# Patient Record
Sex: MALE | Race: WHITE | NOT HISPANIC OR LATINO | URBAN - METROPOLITAN AREA
[De-identification: names, ages, dates, MRNs, and addresses within clinical notes are randomized per-mention and may not be internally consistent; named-entity substitution may affect disease eponyms.]

---

## 2017-07-28 ENCOUNTER — EMERGENCY (EMERGENCY)
Facility: HOSPITAL | Age: 27
LOS: 1 days | Discharge: HOME | End: 2017-07-28
Attending: EMERGENCY MEDICINE | Admitting: EMERGENCY MEDICINE
Payer: SELF-PAY

## 2017-07-28 VITALS
WEIGHT: 134.48 LBS | HEART RATE: 104 BPM | HEIGHT: 68 IN | OXYGEN SATURATION: 97 % | RESPIRATION RATE: 18 BRPM | TEMPERATURE: 99 F | DIASTOLIC BLOOD PRESSURE: 65 MMHG | SYSTOLIC BLOOD PRESSURE: 107 MMHG

## 2017-07-28 DIAGNOSIS — J02.9 ACUTE PHARYNGITIS, UNSPECIFIED: ICD-10-CM

## 2017-07-28 DIAGNOSIS — R05 COUGH: ICD-10-CM

## 2017-07-28 PROCEDURE — 99053 MED SERV 10PM-8AM 24 HR FAC: CPT

## 2017-07-28 PROCEDURE — 99284 EMERGENCY DEPT VISIT MOD MDM: CPT | Mod: 25

## 2017-07-28 NOTE — ED ADULT TRIAGE NOTE - ARRIVAL INFO ADDITIONAL COMMENTS
Pt to the ER c/o sore throat x 1 week not relieved with not relieved with Tylenol or Nyquil at home.  Pt denies CP, SOB, N/V/D, fevers, sick contacts, or travel out of the country in the last 3 weeks.

## 2017-07-29 PROCEDURE — 96372 THER/PROPH/DIAG INJ SC/IM: CPT

## 2017-07-29 PROCEDURE — 99283 EMERGENCY DEPT VISIT LOW MDM: CPT | Mod: 25

## 2017-07-29 RX ORDER — ONDANSETRON 8 MG/1
4 TABLET, FILM COATED ORAL ONCE
Qty: 0 | Refills: 0 | Status: DISCONTINUED | OUTPATIENT
Start: 2017-07-29 | End: 2017-07-29

## 2017-07-29 RX ORDER — DEXAMETHASONE 0.5 MG/5ML
10 ELIXIR ORAL ONCE
Qty: 0 | Refills: 0 | Status: COMPLETED | OUTPATIENT
Start: 2017-07-29 | End: 2017-07-29

## 2017-07-29 RX ORDER — AMOXICILLIN 250 MG/5ML
1 SUSPENSION, RECONSTITUTED, ORAL (ML) ORAL
Qty: 20 | Refills: 0 | OUTPATIENT
Start: 2017-07-29 | End: 2017-08-08

## 2017-07-29 RX ORDER — AMOXICILLIN 250 MG/5ML
500 SUSPENSION, RECONSTITUTED, ORAL (ML) ORAL ONCE
Qty: 0 | Refills: 0 | Status: COMPLETED | OUTPATIENT
Start: 2017-07-29 | End: 2017-07-29

## 2017-07-29 RX ORDER — FAMOTIDINE 10 MG/ML
20 INJECTION INTRAVENOUS ONCE
Qty: 0 | Refills: 0 | Status: DISCONTINUED | OUTPATIENT
Start: 2017-07-29 | End: 2017-07-29

## 2017-07-29 RX ADMIN — Medication 500 MILLIGRAM(S): at 01:49

## 2017-07-29 RX ADMIN — Medication 500 MILLIGRAM(S): at 01:30

## 2017-07-29 RX ADMIN — Medication 10 MILLIGRAM(S): at 01:30

## 2017-07-29 NOTE — ED PROVIDER NOTE - OBJECTIVE STATEMENT
26M no PMH c/o sore throat. pt states sore throat worsening over past week. occasional cough. no fevers. states significant other was sick with cold recently.  no recent travel. no n/v/d. no drooling. no voice changes.

## 2017-07-29 NOTE — ED PROVIDER NOTE - THROAT FINDINGS
NO STRIDOR/uvula midline/tonsillar exudate, no trismus/TONSILLAR SWELLING/THROAT RED/NO DROOLING/NO TONGUE ELEVATION

## 2017-07-29 NOTE — ED ADULT NURSE NOTE - CHPI ED SYMPTOMS NEG
no loss of consciousness/no chills/no change in level of consciousness/no numbness/no nausea/no fever/no syncope/no blurred vision/no weakness/no vomiting

## 2017-07-29 NOTE — ED PROVIDER NOTE - MEDICAL DECISION MAKING DETAILS
sore throat, exudates, no evidence of PTA, no drooling, no resp distress, no airway compromise  -decadron, naproxen, amoxicillin

## 2018-05-24 ENCOUNTER — EMERGENCY (EMERGENCY)
Facility: HOSPITAL | Age: 28
LOS: 1 days | Discharge: HOME | End: 2018-05-24
Admitting: EMERGENCY MEDICINE
Payer: SELF-PAY

## 2018-05-24 VITALS
DIASTOLIC BLOOD PRESSURE: 79 MMHG | TEMPERATURE: 98 F | RESPIRATION RATE: 18 BRPM | WEIGHT: 140.21 LBS | HEART RATE: 77 BPM | SYSTOLIC BLOOD PRESSURE: 110 MMHG | OXYGEN SATURATION: 97 %

## 2018-05-24 DIAGNOSIS — Z88.2 ALLERGY STATUS TO SULFONAMIDES: ICD-10-CM
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
## 2018-05-24 DIAGNOSIS — M25.521 PAIN IN RIGHT ELBOW: ICD-10-CM

## 2018-05-24 PROCEDURE — 99283 EMERGENCY DEPT VISIT LOW MDM: CPT

## 2018-05-24 PROCEDURE — 73080 X-RAY EXAM OF ELBOW: CPT

## 2018-05-24 PROCEDURE — 73080 X-RAY EXAM OF ELBOW: CPT | Mod: 26,RT

## 2018-05-24 PROCEDURE — 99283 EMERGENCY DEPT VISIT LOW MDM: CPT | Mod: 25
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
## 2018-05-24 NOTE — ED PROVIDER NOTE - MEDICAL DECISION MAKING DETAILS
ED course unremarkable - afebrile and hemodynamically stable. ED course unremarkable - afebrile and hemodynamically stable. Tdap up to date. Declined pain medication. RUE neurovascularly intact. R elbow x-ray with posterior fat pad present, concerning for occult radial head fracture. No dislocation. Discussed results with patient. Placed in sling, recommend tylenol for pain control. Given referral to Orthopedics; however, pt states he will follow up with personal orthopedist. Return precautions given.
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
## 2018-05-24 NOTE — ED PROVIDER NOTE - NS ED ROS FT
Constitutional: No fever. No chills.  Eyes: No redness. No discharge. No vision change.   ENT: No sore throat. No ear pain.  Cardiovascular: No chest pain. No leg swelling.  Respiratory: No cough. No shortness of breath.  GI: No abdominal pain. No vomiting. No diarrhea.   MSK: +joint pain. No back pain.   Skin: No rash. +abrasion.  Neuro: No numbness. No weakness.   Psych: No known mental health issues.

## 2018-05-24 NOTE — ED PROVIDER NOTE - PHYSICAL EXAMINATION
VITAL SIGNS: I have reviewed nursing notes and confirm.  CONSTITUTIONAL: Well-developed; well-nourished; in no acute distress.   SKIN:  warm and dry, no acute rash.   HEAD:  normocephalic, atraumatic.  EYES: PERRL, EOM intact; conjunctiva and sclera clear.  ENT: No nasal discharge; airway clear.   NECK: Supple; non tender.  BACK: no midline vertebral ttp  CARD: S1, S2 normal; no murmurs, gallops, or rubs. Regular rate and rhythm.   RESP:  Clear to auscultation b/l, no wheezes, rales or rhonchi.  ABD: Normal bowel sounds; soft; non-distended; non-tender; no guarding/ rebound.  EXT: Mild swelling of R elbow. Unable to fully flex or extend 2/2 pain. Abrasion over R olecranon process. FROM of R shoulder and wrist w/o pain. No clubbing, cyanosis. 2+ pulses to b/l ue/le.  NEURO: Alert, oriented, grossly unremarkable. 5/5 strength in upper extremities. Sensation equal and intact bilaterally.  PSYCH: Cooperative, mood and affect appropriate.
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
## 2021-05-26 ENCOUNTER — EMERGENCY (EMERGENCY)
Facility: HOSPITAL | Age: 31
LOS: 1 days | Discharge: HOME | End: 2021-05-26
Attending: EMERGENCY MEDICINE | Admitting: EMERGENCY MEDICINE
Payer: COMMERCIAL

## 2021-05-26 VITALS
WEIGHT: 130.07 LBS | HEART RATE: 80 BPM | DIASTOLIC BLOOD PRESSURE: 71 MMHG | HEIGHT: 68 IN | RESPIRATION RATE: 18 BRPM | TEMPERATURE: 98 F | SYSTOLIC BLOOD PRESSURE: 107 MMHG | OXYGEN SATURATION: 96 %

## 2021-05-26 DIAGNOSIS — J03.90 ACUTE TONSILLITIS, UNSPECIFIED: ICD-10-CM

## 2021-05-26 DIAGNOSIS — J02.9 ACUTE PHARYNGITIS, UNSPECIFIED: ICD-10-CM

## 2021-05-26 LAB — S PYO AG SPEC QL IA: NEGATIVE — SIGNIFICANT CHANGE UP

## 2021-05-26 PROCEDURE — 99283 EMERGENCY DEPT VISIT LOW MDM: CPT

## 2021-05-26 PROCEDURE — 87081 CULTURE SCREEN ONLY: CPT

## 2021-05-26 PROCEDURE — 87880 STREP A ASSAY W/OPTIC: CPT

## 2021-05-26 RX ADMIN — Medication 1 TABLET(S): at 16:09

## 2021-05-26 NOTE — ED PROVIDER NOTE - PATIENT PORTAL LINK FT
You can access the FollowMyHealth Patient Portal offered by [facility] by registering at the following website: [email or web address]. By joining [employer]’s FollowMyHealth portal, you will also be able to view your health information using other applications (apps) compatible with our system.

## 2021-05-26 NOTE — ED PROVIDER NOTE - NSFOLLOWUPINSTRUCTIONS_ED_ALL_ED_FT
Tonsillitis       Tonsillitis is an infection of the throat that causes the tonsils to become red, tender, and swollen. Tonsils are tissues in the back of your throat. Each tonsil has crevices (crypts). Tonsils normally work to protect the body from infection.      What are the causes?    Sudden (acute) tonsillitis may be caused by a virus or bacteria, including streptococcal bacteria. Long-lasting (chronic) tonsillitis occurs when the crypts of the tonsils become filled with pieces of food and bacteria, which makes it easy for the tonsils to become repeatedly infected.    Tonsillitis can be spread from person to person (is contagious). It may be spread by inhaling droplets that are released with coughing or sneezing. You may also come into contact with viruses or bacteria on surfaces, such as cups or utensils.      What are the signs or symptoms?  Symptoms of this condition include:  •A sore throat. This may include trouble swallowing.      •White patches on the tonsils.      •Swollen tonsils.      •Fever.      •Headache.      •Tiredness.      •Loss of appetite.      •Snoring during sleep when you did not snore before.      •Small, foul-smelling, yellowish-white pieces of material (tonsilloliths) that you occasionally cough up or spit out. These can cause you to have bad breath.        How is this diagnosed?    This condition is diagnosed with a physical exam. Diagnosis can be confirmed with the results of lab tests, including a throat culture.      How is this treated?  Treatment for this condition depends on the cause, but usually focuses on treating the symptoms associated with it. Treatment may include:  •Medicines to relieve pain and manage fever.      •Steroid medicines to reduce swelling.      •Antibiotic medicines if the condition is caused by bacteria.      If attacks of tonsillitis are severe and frequent, your health care provider may recommend surgery to remove the tonsils (tonsillectomy).      Follow these instructions at home:    Medicines     •Take over-the-counter and prescription medicines only as told by your health care provider.      •If you were prescribed an antibiotic medicine, take it as told by your health care provider. Do not stop taking the antibiotic even if you start to feel better.      Eating and drinking     •Drink enough fluid to keep your urine clear or pale yellow.      •While your throat is sore, eat soft or liquid foods, such as sherbet, soups, or instant breakfast drinks.      •Drink warm liquids.      •Eat frozen ice pops.      General instructions     •Rest as much as possible and get plenty of sleep.      •Gargle with a salt-water mixture 3–4 times a day or as needed. To make a salt-water mixture, completely dissolve ½-1 tsp of salt in 1 cup of warm water.      •Wash your hands regularly with soap and water. If soap and water are not available, use hand .      • Do not share cups, bottles, or other utensils until your symptoms have gone away.      • Do not smoke. This can help your symptoms and prevent the infection from coming back. If you need help quitting, ask your health care provider.      •Keep all follow-up visits as told by your health care provider. This is important.        Contact a health care provider if:    •You notice large, tender lumps in your neck that were not there before.      •You have a fever that does not go away after 2–3 days.      •You develop a rash.      •You cough up a green, yellow-brown, or bloody substance.      •You cannot swallow liquids or food for 24 hours.      •Only one of your tonsils is swollen.        Get help right away if:    •You develop any new symptoms, such as vomiting, severe headache, stiff neck, chest pain, trouble breathing, or trouble swallowing.      •You have severe throat pain along with drooling or voice changes.      •You have severe pain that is not controlled with medicines.      •You cannot fully open your mouth.      •You develop redness, swelling, or severe pain anywhere in your neck.        Summary    •Tonsillitis is an infection of the throat that causes the tonsils to become red, tender, and swollen.      •Tonsillitis may be caused by a virus or bacteria.      •Rest as much as possible. Get plenty of sleep.      This information is not intended to replace advice given to you by your health care provider. Make sure you discuss any questions you have with your health care provider.      Document Revised: 11/30/2018 Document Reviewed: 01/23/2018    [employer] Patient Education © 2021 [employer]

## 2021-05-26 NOTE — ED ADULT TRIAGE NOTE - NS ED NURSE BANDS TYPE
Chris Raymundo is requesting a refill of hydrocodone-acetaminophen  for a 60 day supply and would like sent to local pharmacy, [facility] on [address].     Ok to leave a detailed message on voice mail Yes  
Medication(s) Requested: Norco  Last office visit: 7/19/19 and has follow up on 1/31/20  Last refill: 1/22/19 #180  Is the patient due for refill of this medication(s): Yes  PDMP review: Criteria met. Forwarded to Physician/KIERRA for signature.     
Signed   
Name band;

## 2021-05-26 NOTE — ED PROVIDER NOTE - CARE PROVIDERS DIRECT ADDRESSES
[email or web address]

## 2021-05-26 NOTE — ED PROVIDER NOTE - ENMT, MLM
Airway patent, Nasal mucosa clear. Mouth with normal mucosa. Throat - + exudate R tonsil - no pta / no fluctuance/  , no oropharyngeal exudates and uvula is midline.

## 2021-05-26 NOTE — ED PROVIDER NOTE - OBJECTIVE STATEMENT
31 yo M with noted "patch" on R tonsillar pillar he noted earlier today mild throat discomfort with swallowing no fever or chills  no N/V - his wife had a cold/ URI 2 weeks ago but no sore throat  no cough or sob no headache no skin rashes - no hx of Covid in past  no loss of taste or smell

## 2021-05-26 NOTE — ED PROVIDER NOTE - CLINICAL SUMMARY MEDICAL DECISION MAKING FREE TEXT BOX
R tonsilitis  rec warm saline gargles advil for pain and augmentin 875 BID  ent referral 30 M with noted R tonsilitis/  rec warm saline gargles advil for pain and augmentin 875 BID  ent referral provided

## 2021-05-26 NOTE — ED PROVIDER NOTE - CARE PROVIDER_API CALL
Manjit Sharma)  Otolaryngology  [address] [place] [address], [address]  [place], [place] [address]  Phone: [phone]  Fax: [phone]  Follow Up Time: 1-3 Days

## 2021-06-03 ENCOUNTER — TRANSCRIPTION ENCOUNTER (OUTPATIENT)
Age: 31
End: 2021-06-03

## 2021-06-03 ENCOUNTER — APPOINTMENT (OUTPATIENT)
Dept: OTOLARYNGOLOGY | Facility: CLINIC | Age: 31
End: 2021-06-03
Payer: COMMERCIAL

## 2021-06-03 VITALS
WEIGHT: 130 LBS | HEIGHT: 68 IN | BODY MASS INDEX: 19.7 KG/M2 | DIASTOLIC BLOOD PRESSURE: 69 MMHG | TEMPERATURE: 97.3 F | SYSTOLIC BLOOD PRESSURE: 110 MMHG

## 2021-06-03 PROBLEM — Z00.00 ENCOUNTER FOR PREVENTIVE HEALTH EXAMINATION: Status: ACTIVE | Noted: 2021-06-03

## 2021-06-03 PROCEDURE — 99072 ADDL SUPL MATRL&STAF TM PHE: CPT

## 2021-06-03 PROCEDURE — 99204 OFFICE O/P NEW MOD 45 MIN: CPT

## 2021-06-03 RX ORDER — CHLORHEXIDINE GLUCONATE, 0.12% ORAL RINSE 1.2 MG/ML
0.12 SOLUTION DENTAL
Qty: 1 | Refills: 5 | Status: ACTIVE | COMMUNITY
Start: 2021-06-03 | End: 1900-01-01

## 2021-06-03 NOTE — HISTORY OF PRESENT ILLNESS
[de-identified] : 30M here for initial evaluation.\par \par Last week he saw a white spot in the back of his throat on the right which persisted and got him worried and he went to ED. At that time, he c/o mild right throat discomfort and bad breath. There, was given augmentin and is here in followup. He finished the abx yesterday, but his sx remain as does the white spot. There is no pain, no difficulty eating, breathing, swallowing or talking.\par \par ROS otherwise unremarkable.

## 2021-06-03 NOTE — PHYSICAL EXAM
[Midline] : trachea located in midline position [de-identified] : ~2+ right tonsil, large crypts w large conglomerate of stones over anterior surface, mostly all removed. left tonsil 1-2+ no stones; no exudates [de-identified] : posterior opx clear [Normal] : no rashes

## 2021-06-03 NOTE — ASSESSMENT
[FreeTextEntry1] : 30M here for initial evaluation. Last week he saw a white spot in the back of his throat on the right which persisted and got him worried and he went to ED. At that time, he c/o mild right throat discomfort and bad breath. There, was given augmentin and is here in followup. He finished the abx yesterday, but his sx remain as does the white spot. There is no pain, no difficulty eating, breathing, swallowing or talking.\par On exam, the right tonsil is 2+ w large crypts and a large conglomerate of stones over the anterior surface within very large crypt extending behind anterior pillar, mostly all removed w forceps and cotton swab; the left tonsil is 1-2+ with no stones. There are no exudates and the posterior opx is widely patent.\par He has a large right tonsil stone conglomerate in cryptic tonsils. Will start peridex and RTO 4-6 weeks or so. Should sx remain, can consider tonsillectomy.

## 2021-06-07 NOTE — ED PROVIDER NOTE - RESPIRATORY NEGATIVE STATEMENT, MLM
Detail Level: Detailed Detail Level: Generalized Detail Level: Simple Detail Level: Zone no chest pain, no cough, and no shortness of breath.

## 2021-07-20 ENCOUNTER — APPOINTMENT (OUTPATIENT)
Dept: OTOLARYNGOLOGY | Facility: CLINIC | Age: 31
End: 2021-07-20
Payer: COMMERCIAL

## 2021-07-20 VITALS
DIASTOLIC BLOOD PRESSURE: 72 MMHG | HEART RATE: 91 BPM | SYSTOLIC BLOOD PRESSURE: 112 MMHG | WEIGHT: 130 LBS | TEMPERATURE: 98.5 F | HEIGHT: 68 IN | BODY MASS INDEX: 19.7 KG/M2

## 2021-07-20 DIAGNOSIS — J35.8 OTHER CHRONIC DISEASES OF TONSILS AND ADENOIDS: ICD-10-CM

## 2021-07-20 PROCEDURE — 99213 OFFICE O/P EST LOW 20 MIN: CPT

## 2021-07-20 PROCEDURE — 99072 ADDL SUPL MATRL&STAF TM PHE: CPT

## 2021-07-20 RX ORDER — AMOXICILLIN AND CLAVULANATE POTASSIUM 875; 125 MG/1; MG/1
875-125 TABLET, COATED ORAL
Qty: 14 | Refills: 0 | Status: ACTIVE | COMMUNITY
Start: 2021-05-26

## 2021-07-20 NOTE — ASSESSMENT
[FreeTextEntry1] : 30M here in followup. Since last seen nearly 2 months ago he is doing well. He has since been using a waterpick and this is able to remove the tonsil stones when/if they recur. There is no further throat discomfort or bad breath. There is no pain, no difficulty eating, breathing, swallowing or talking. He is content. Complete and comprehensive head and neck exam is unremarkable; tonsils are 1-2+ cryptic and symmetric with no stones or exudates.\par Waterpick keeps tonsil stones at bay. RTO should stones/sx recur to discuss tonsillectomy. Otherwise, RTO as needed.

## 2021-07-20 NOTE — HISTORY OF PRESENT ILLNESS
[de-identified] : 30M here in followup.\par \par Since last seen nearly 2 months ago he is doing well. He has since been using a waterpick and this is able to remove the tonsil stones when/if they recur. There is no further throat discomfort or bad breath. There is no pain, no difficulty eating, breathing, swallowing or talking.\par \par ROS otherwise unremarkable.

## 2021-07-20 NOTE — PHYSICAL EXAM
[Midline] : trachea located in midline position [de-identified] : 1-2+ tonsils, cryptic, no stones, no exudates [de-identified] : posterior opx clear [Normal] : no rashes

## 2023-10-12 NOTE — ED ADULT NURSE NOTE - PRIMARY CARE PROVIDER
Initiate Treatment: DeLight cream from [employer] at night with sunscreen during day. \\nRecommend patient to cool down face with ice packs while exposed to heat. Detail Level: Zone Render In Strict Bullet Format?: No Unknown.

## 2024-05-13 NOTE — ED ADULT NURSE NOTE - NSFALLRSKOUTCOME_ED_ALL_ED
I have personally seen and examined the patient. I have collaborated with and supervised the
Universal Safety Interventions

## 2024-12-18 ENCOUNTER — APPOINTMENT (OUTPATIENT)
Dept: INTERNAL MEDICINE | Facility: CLINIC | Age: 34
End: 2024-12-18

## 2025-06-09 ENCOUNTER — APPOINTMENT (OUTPATIENT)
Dept: ORTHOPEDIC SURGERY | Facility: CLINIC | Age: 35
End: 2025-06-09
Payer: COMMERCIAL

## 2025-06-09 ENCOUNTER — NON-APPOINTMENT (OUTPATIENT)
Age: 35
End: 2025-06-09

## 2025-06-09 VITALS — BODY MASS INDEX: 29.25 KG/M2 | RESPIRATION RATE: 16 BRPM | HEIGHT: 68 IN | WEIGHT: 193 LBS

## 2025-06-09 PROCEDURE — 73110 X-RAY EXAM OF WRIST: CPT | Mod: 50

## 2025-06-09 PROCEDURE — 99203 OFFICE O/P NEW LOW 30 MIN: CPT

## 2025-06-09 PROCEDURE — 73070 X-RAY EXAM OF ELBOW: CPT | Mod: 50

## 2025-06-16 ENCOUNTER — APPOINTMENT (OUTPATIENT)
Dept: ORTHOPEDIC SURGERY | Facility: CLINIC | Age: 35
End: 2025-06-16
Payer: COMMERCIAL

## 2025-06-16 VITALS — HEIGHT: 68 IN | BODY MASS INDEX: 21.22 KG/M2 | WEIGHT: 140 LBS | RESPIRATION RATE: 16 BRPM

## 2025-06-16 PROCEDURE — 73110 X-RAY EXAM OF WRIST: CPT | Mod: 50

## 2025-06-16 PROCEDURE — 99213 OFFICE O/P EST LOW 20 MIN: CPT

## 2025-06-16 PROCEDURE — 73070 X-RAY EXAM OF ELBOW: CPT | Mod: 50
